# Patient Record
Sex: MALE | Race: OTHER | HISPANIC OR LATINO | Employment: OTHER | URBAN - METROPOLITAN AREA
[De-identification: names, ages, dates, MRNs, and addresses within clinical notes are randomized per-mention and may not be internally consistent; named-entity substitution may affect disease eponyms.]

---

## 2024-11-23 ENCOUNTER — HOSPITAL ENCOUNTER (EMERGENCY)
Facility: HOSPITAL | Age: 24
Discharge: HOME | End: 2024-11-23
Attending: EMERGENCY MEDICINE

## 2024-11-23 VITALS
DIASTOLIC BLOOD PRESSURE: 92 MMHG | HEART RATE: 102 BPM | SYSTOLIC BLOOD PRESSURE: 158 MMHG | BODY MASS INDEX: 22.75 KG/M2 | WEIGHT: 141.56 LBS | TEMPERATURE: 98 F | HEIGHT: 66 IN | RESPIRATION RATE: 20 BRPM | OXYGEN SATURATION: 100 %

## 2024-11-23 DIAGNOSIS — L29.3 ITCHING OF MALE GENITALIA: Primary | ICD-10-CM

## 2024-11-23 LAB
BACTERIA #/AREA URNS AUTO: ABNORMAL /HPF
BILIRUB UR QL STRIP.AUTO: NEGATIVE
C TRACH DNA SPEC QL NAA+PROBE: NOT DETECTED
CLARITY UR: CLEAR
COLOR UR AUTO: ABNORMAL
GLUCOSE UR QL STRIP: NORMAL
HGB UR QL STRIP: NEGATIVE
HYALINE CASTS #/AREA URNS LPF: ABNORMAL /LPF
KETONES UR QL STRIP: NEGATIVE
LEUKOCYTE ESTERASE UR QL STRIP: NEGATIVE
N GONORRHOEA DNA SPEC QL NAA+PROBE: NOT DETECTED
NITRITE UR QL STRIP: NEGATIVE
PH UR STRIP: 6.5 [PH]
PROT UR QL STRIP: ABNORMAL
RBC #/AREA URNS AUTO: ABNORMAL /HPF
SOURCE (OHS): NORMAL
SP GR UR STRIP.AUTO: 1.02 (ref 1–1.03)
SQUAMOUS #/AREA URNS LPF: ABNORMAL /HPF
UROBILINOGEN UR STRIP-ACNC: NORMAL
WBC #/AREA URNS AUTO: ABNORMAL /HPF

## 2024-11-23 PROCEDURE — 99283 EMERGENCY DEPT VISIT LOW MDM: CPT

## 2024-11-23 PROCEDURE — 87591 N.GONORRHOEAE DNA AMP PROB: CPT | Performed by: PHYSICIAN ASSISTANT

## 2024-11-23 PROCEDURE — 81001 URINALYSIS AUTO W/SCOPE: CPT | Performed by: PHYSICIAN ASSISTANT

## 2024-11-23 RX ORDER — FLUCONAZOLE 200 MG/1
200 TABLET ORAL WEEKLY
Qty: 3 TABLET | Refills: 0 | Status: SHIPPED | OUTPATIENT
Start: 2024-11-23 | End: 2024-12-08

## 2024-11-23 NOTE — ED PROVIDER NOTES
Encounter Date: 11/23/2024       History     Chief Complaint   Patient presents with    genital itching     C/o genital itching possibly due to uti or std. States had previous hx of both when had same symptoms.      24 year old Italian speaking male, presents to the emergency department with complaints of genital itching x > 1 week.  He states it has felt this way in the past with both a UTI and STI.  He denies dysuria, hematuria, rash or sore.     The history is provided by the patient. A  was used.     Review of patient's allergies indicates:  No Known Allergies  History reviewed. No pertinent past medical history.  History reviewed. No pertinent surgical history.  No family history on file.  Social History     Tobacco Use    Smoking status: Never    Smokeless tobacco: Never   Substance Use Topics    Alcohol use: Never    Drug use: Never     Review of Systems   Constitutional:  Negative for chills and fever.   Respiratory:  Negative for cough and shortness of breath.    Gastrointestinal:  Negative for abdominal pain, nausea and vomiting.   Genitourinary:  Negative for dysuria, flank pain, genital sores, hematuria, penile discharge, penile swelling, scrotal swelling and testicular pain.        Itching inside penis     Musculoskeletal:  Negative for back pain.   Skin:  Negative for rash.       Physical Exam     Initial Vitals [11/23/24 1120]   BP Pulse Resp Temp SpO2   (!) 158/92 102 20 98.1 °F (36.7 °C) 100 %      MAP       --         Physical Exam    Nursing note and vitals reviewed.  Constitutional: He appears well-developed and well-nourished.   Cardiovascular:  Normal rate, regular rhythm, normal heart sounds and intact distal pulses.           Pulmonary/Chest: Breath sounds normal.   Abdominal: Abdomen is soft. Bowel sounds are normal.   Genitourinary:    Penis normal.   No discharge found.    Genitourinary Comments: No rash       Musculoskeletal:         General: Normal range of motion.      Neurological: He is alert. GCS score is 15. GCS eye subscore is 4. GCS verbal subscore is 5. GCS motor subscore is 6.   Skin: Skin is warm.         ED Course   Procedures  Labs Reviewed   URINALYSIS, REFLEX TO URINE CULTURE - Abnormal       Result Value    Color, UA Light-Yellow      Appearance, UA Clear      Specific Gravity, UA 1.021      pH, UA 6.5      Protein, UA Trace (*)     Glucose, UA Normal      Ketones, UA Negative      Blood, UA Negative      Bilirubin, UA Negative      Urobilinogen, UA Normal      Nitrites, UA Negative      Leukocyte Esterase, UA Negative      RBC, UA 0-5      WBC, UA None Seen      Bacteria, UA None Seen      Squamous Epithelial Cells, UA Trace (*)     Hyaline Casts, UA None Seen     CHLAMYDIA/GONORRHOEAE(GC), PCR    Chlamydia trachomatis PCR Not Detected      N. gonorrhea PCR Not Detected      Source Urine      Narrative:     The Xpert CT/NG test, performed on the [employer] system is a qualitative in vitro real-time polymerase chain reaction (PCR) test for the automated detected and differentiation for genomic DNA from Chlamydia trachomatis (CT) and/or Neisseria gonorrhoeae (NG).          Imaging Results    None          Medications - No data to display  Medical Decision Making  24 year old [place]ican speaking male, presents to the emergency department with complaints of genital itching x > 1 week.  He states it has felt this way in the past with both a UTI and STI.  He denies dysuria, hematuria, rash or sore.     DDx:  UTI, STI, yeast infection    Gonorrhea  & chlamydia negative.  Urinalysis unremarkable.  Prescribed Diflucan for possible yeast infection.  Referral sent to urology for further evaluation.  Gave patient information to health unit for full STI workup.    Amount and/or Complexity of Data Reviewed  Labs:  Decision-making details documented in ED Course.    Risk  Prescription drug management.               ED Course as of 11/23/24 1635   Sat Nov 23, 2024   1352 Chlamydia  trachomatis PCR: Not Detected [ER]   1352 N. gonorrhea PCR: Not Detected [ER]   1412 The patient is resting comfortably and in no acute distress.  I personally discussed his test results and treatment plan.  Gave strict ED precautions and specific conditions for return to the emergency department and importance of follow up with pcp.  Patient voices understanding and agrees to the plan discussed. All patients' questions have been answered at this time. He has remained hemodynamically stable throughout entire stay in ED and is stable for discharge home. [ER]   1628 Urinalysis, Reflex to Urine Culture(!)  Unremarkable [ER]      ED Course User Index  [ER] Octavia Simeon PA                           Clinical Impression:  Final diagnoses:  [L29.3] Itching of male genitalia (Primary)          ED Disposition Condition    Discharge Stable          ED Prescriptions       Medication Sig Dispense Start Date End Date Auth. Provider    fluconazole (DIFLUCAN) 200 MG Tab Take 1 tablet (200 mg total) by mouth once a week. for 3 doses 3 tablet 11/23/2024 12/8/2024 Octavia Simeon PA          Follow-up Information       Follow up With Specialties Details Why Contact Info    [facility] Emergency Medicine  As needed, If symptoms worsen [address] [facility] [address] [phone]    [facility] Urology  They will call you to schedule an apt. [address] [facility] [address] [phone]             Octavia Simeon PA  11/23/24 9980       Octavia Simeon PA  11/23/24 0338

## 2024-11-23 NOTE — DISCHARGE INSTRUCTIONS
Seguimiento en la unidad de nima para un estudio completo de ETS:  [address] [place] [address]     Jonh un seguimiento con almazan médico dentro de 2 a 3 días.  Devolver si es necesario.    Follow up at the health unit for full STD work up :  [address] [place] [address]     Follow up with your doctor within 2-3 days.  Return if needed.